# Patient Record
Sex: MALE
[De-identification: names, ages, dates, MRNs, and addresses within clinical notes are randomized per-mention and may not be internally consistent; named-entity substitution may affect disease eponyms.]

---

## 2017-09-03 ENCOUNTER — HOSPITAL ENCOUNTER (EMERGENCY)
Dept: HOSPITAL 14 - H.ER | Age: 22
Discharge: HOME | End: 2017-09-03
Payer: MEDICAID

## 2017-09-03 VITALS — TEMPERATURE: 98 F | RESPIRATION RATE: 16 BRPM

## 2017-09-03 VITALS — SYSTOLIC BLOOD PRESSURE: 135 MMHG | DIASTOLIC BLOOD PRESSURE: 81 MMHG | HEART RATE: 81 BPM

## 2017-09-03 VITALS — OXYGEN SATURATION: 99 %

## 2017-09-03 DIAGNOSIS — R59.0: Primary | ICD-10-CM

## 2017-09-03 DIAGNOSIS — Z90.49: ICD-10-CM

## 2017-09-03 LAB
ALBUMIN/GLOB SERPL: 1.3 {RATIO} (ref 1–2.1)
ALP SERPL-CCNC: 68 U/L (ref 38–126)
ALT SERPL-CCNC: 35 U/L (ref 21–72)
AST SERPL-CCNC: 34 U/L (ref 17–59)
BACTERIA #/AREA URNS HPF: (no result) /[HPF]
BILIRUB SERPL-MCNC: 0.8 MG/DL (ref 0.2–1.3)
BILIRUB UR-MCNC: NEGATIVE MG/DL
BUN SERPL-MCNC: 19 MG/DL (ref 9–20)
CALCIUM SERPL-MCNC: 10.1 MG/DL (ref 8.4–10.2)
CHLORIDE SERPL-SCNC: 102 MMOL/L (ref 98–107)
CO2 SERPL-SCNC: 27 MMOL/L (ref 22–30)
COLOR UR: YELLOW
ERYTHROCYTE [DISTWIDTH] IN BLOOD BY AUTOMATED COUNT: 12.8 % (ref 11.5–14.5)
GLOBULIN SER-MCNC: 3.7 GM/DL (ref 2.2–3.9)
GLUCOSE SERPL-MCNC: 90 MG/DL (ref 75–110)
GLUCOSE UR STRIP-MCNC: (no result) MG/DL
HCT VFR BLD CALC: 45.3 % (ref 35–51)
KETONES UR STRIP-MCNC: NEGATIVE MG/DL
LEUKOCYTE ESTERASE UR-ACNC: (no result) LEU/UL
MCH RBC QN AUTO: 31.1 PG (ref 27–31)
MCHC RBC AUTO-ENTMCNC: 34.7 G/DL (ref 33–37)
MCV RBC AUTO: 89.4 FL (ref 80–94)
PH UR STRIP: 6 [PH] (ref 5–8)
PLATELET # BLD: 232 K/UL (ref 130–400)
POTASSIUM SERPL-SCNC: 4.3 MMOL/L (ref 3.6–5)
PROT SERPL-MCNC: 8.8 G/DL (ref 6.3–8.2)
PROT UR STRIP-MCNC: NEGATIVE MG/DL
RBC # UR STRIP: NEGATIVE /UL
RBC #/AREA URNS HPF: 2 /HPF (ref 0–3)
SODIUM SERPL-SCNC: 142 MMOL/L (ref 132–148)
SP GR UR STRIP: 1.03 (ref 1–1.03)
UROBILINOGEN UR-MCNC: (no result) MG/DL (ref 0.2–1)
WBC # BLD AUTO: 8.4 K/UL (ref 4.8–10.8)
WBC #/AREA URNS HPF: 13 /HPF (ref 0–5)

## 2017-09-03 PROCEDURE — 87086 URINE CULTURE/COLONY COUNT: CPT

## 2017-09-03 PROCEDURE — 99282 EMERGENCY DEPT VISIT SF MDM: CPT

## 2017-09-03 PROCEDURE — 80053 COMPREHEN METABOLIC PANEL: CPT

## 2017-09-03 PROCEDURE — 81003 URINALYSIS AUTO W/O SCOPE: CPT

## 2017-09-03 PROCEDURE — 87491 CHLMYD TRACH DNA AMP PROBE: CPT

## 2017-09-03 PROCEDURE — 72193 CT PELVIS W/DYE: CPT

## 2017-09-03 PROCEDURE — 87591 N.GONORRHOEAE DNA AMP PROB: CPT

## 2017-09-03 PROCEDURE — 85027 COMPLETE CBC AUTOMATED: CPT

## 2017-09-03 NOTE — CT
EXAM:

  CT Pelvis With Intravenous Contrast



CLINICAL HISTORY:

  21 years old, male; Pain; Pelvic pain; Prior surgery; Surgery date: 6+ 

months; Surgery type: Appendectomy childhood; Patient HX: Llq pain/ swelling; 

Additional info: Left sided hernia



TECHNIQUE:

  Axial computed tomography images of the pelvis with intravenous contrast.  

All CT scans at this facility use one or more dose reduction techniques, viz.: 

automated exposure control; ma/kV adjustment per patient size (including 

targeted exams where dose is matched to indication; i.e. head); or iterative 

reconstruction technique.



CONTRAST:

  98 mL of OMNIPAQUE administered intravenously.



COMPARISON:

  No relevant prior studies available.



FINDINGS:

  Bowel:  Unremarkable.  

  Appendix: Surgically absent by history.

  Intraperitoneal space:  No free air.  No significant fluid collection.

  Bladder: Thickened wall, likely representing underdistention.

  Reproductive: Incompletely visualized for adequate comment.

  Bones/joints:  No acute fracture.  No dislocation.

  Soft tissues:  Unremarkable.

  Vasculature:  Unremarkable.

  Lymph nodes: Bulky enlarged lymph nodes are detected within the left 

hemipelvis and left inguinal region. The largest lymph node is the left lateral 

vesicular lymph node measuring 3 cm in short axis dimension. The finding within 

the left groin represents lymphadenopathy, specifically within the superficial 

inguinal lymph node basin, rather than a loop of bowel.



IMPRESSION:     

Marked lymphadenopathy within the left hemipelvis and left groin, as detailed 

above.

## 2017-09-03 NOTE — ED PDOC
HPI: Abdomen


Time Seen by Provider: 09/03/17 19:56


Chief Complaint (Nursing): Abnormal Skin Integrity


Chief Complaint (Provider): Lump on left lower abdomen


History Per: Patient


History/Exam Limitations: no limitations


Onset/Duration Of Symptoms: Days


Outside of [place] travel?: No


Current Symptoms Are (Timing): Still Present


Location Of Pain/Discomfort: LLQ


Quality Of Discomfort: "Pain"


Exacerbating Factors: None


Additional History Per: Patient


Additional Complaint(s): 


The patient is a 20yo male, no known past medical history, presents to the ED 

for evaluation of a lump he noticed on his left lower abdomen, present for the 

past 5 days. Patient reports the pain has been worsening and radiates down his 

left leg. He denies taking any medication for his symptoms. At present, patient 

offers no additional medical complaints.





Past Medical History


Reviewed: Historical Data, Nursing Documentation, Vital Signs


Vital Signs: 


 Last Vital Signs











Temp  98.0 F   09/03/17 19:50


 


Pulse  82   09/03/17 19:50


 


Resp  16   09/03/17 19:50


 


BP  145/75   09/03/17 19:50


 


Pulse Ox  99   09/03/17 20:30














- Medical History


PMH: No Chronic Diseases





- Surgical History


Surgical History: Appendectomy





- Family History


Family History: States: No Known Family Hx





- Social History


Current smoker - smoking cessation education provided: Yes


Alcohol: None


Drugs: Cannabis





- Home Medications


Home Medications: 


 Ambulatory Orders











 Medication  Instructions  Recorded


 


Hydrocortisone [Hydrocortisone 1 applic TOP TID #1 tube 01/14/17





2.5% Cream]  


 


Ibuprofen [Motrin] 600 mg PO Q6 PRN #15 tab 01/14/17


 


Doxycycline Monohydrate [Mondoxyne 100 mg PO BID #20 capsule 09/03/17





Nl]  














- Allergies


Allergies/Adverse Reactions: 


 Allergies











Allergy/AdvReac Type Severity Reaction Status Date / Time


 


No Known Allergies Allergy   Verified 04/06/16 22:46














Review of Systems


ROS Statement: Except As Marked, All Systems Reviewed And Found Negative


Gastrointestinal: Positive for: Abdominal Pain (lump on left lower quadrant)





Physical Exam





- Reviewed


Nursing Documentation Reviewed: Yes


Vital Signs Reviewed: Yes





- Physical Exam


Appears: Positive for: Well, Non-toxic, No Acute Distress


Head Exam: Positive for: ATRAUMATIC, NORMAL INSPECTION, NORMOCEPHALIC


Skin: Positive for: Normal Color


Eye Exam: Positive for: Normal appearance


Neck: Positive for: Normal


Respiratory: Negative for: Accessory Muscle Use, Respiratory Distress


Gastrointestinal/Abdominal: Positive for: Soft, Hernia.  Negative for: 

Tenderness


Extremity: Positive for: Normal ROM.  Negative for: Deformity, Swelling


Neurologic/Psych: Positive for: Alert, Oriented.  Negative for: Motor/Sensory 

Deficits





- Laboratory Results


Result Diagrams: 


 09/03/17 20:45





 09/03/17 20:45





- ECG


O2 Sat by Pulse Oximetry: 99 (RA)


Pulse Ox Interpretation: Normal





Medical Decision Making


Medical Decision Making: 


Time: 2010


Impression: Hernia on left lower quadrant


Plan:


-- CMP


-- CBC


-- CT Pelvis w/ IV contrast


-- Chlamydia/GC RNA, TMA


-- Urine culture


-- Urinalysis


Reassess





Scribe Attestation:


Documented by Susan Foreman acting as a scribe for ETIENNE Larry


Provider Attestation:


All medical record entries made by the Scribe were at my direction and 

personally dictated by me. I have reviewed the chart and agree that the record 

accurately reflects my personal performance of the history, physical exam, 

medical decision making, and the department course for this patient. I have 

also personally directed, reviewed, and agree with the discharge instructions 

and disposition.








Disposition





- Clinical Impression


Clinical Impression: 


 Lymphadenopathy, inguinal








- Patient ED Disposition


Is Patient to be Admitted: No


Counseled Patient/Family Regarding: Diagnosis, Need For Followup, Rx Given





- Disposition


Referrals: 


[facility] [Outside]


[facility] [Outside]


Disposition: Routine/Home


Disposition Time: 22:47


Condition: STABLE


Additional Instructions: 


If symptoms do not improve please follow-up with MD for further evaluation. 


Prescriptions: 


Doxycycline Monohydrate [Mondoxyne Nl] 100 mg PO BID #20 capsule


Instructions:  Lymphadenopathy (ED)


Forms:  [facility] ED School/Work Excuse

## 2018-04-10 ENCOUNTER — HOSPITAL ENCOUNTER (EMERGENCY)
Dept: HOSPITAL 14 - H.ER | Age: 23
LOS: 1 days | Discharge: HOME | End: 2018-04-11
Payer: MEDICAID

## 2018-04-10 VITALS
RESPIRATION RATE: 16 BRPM | DIASTOLIC BLOOD PRESSURE: 81 MMHG | OXYGEN SATURATION: 98 % | HEART RATE: 77 BPM | TEMPERATURE: 97.8 F | SYSTOLIC BLOOD PRESSURE: 133 MMHG

## 2018-04-10 DIAGNOSIS — L03.114: Primary | ICD-10-CM

## 2018-04-11 NOTE — ED PDOC
Upper Extremity Pain/Injury


Time Seen by Provider: 18 00:16


Chief Complaint (Nursing): Abnormal Skin Integrity


History Per: Patient


History/Exam Limitations: no limitations


Current Symptoms Are (Timing): Still Present


Additional Complaint(s): 





No PMHx p/w redness around tattoo, got tattoo last friday and redness developed 

today with pain.  no fevers, chills, swelling, or other symptoms.





Past Medical History


Reviewed: Historical Data, Nursing Documentation, Vital Signs


Vital Signs: 


 Last Vital Signs











Temp  97.8 F   04/10/18 23:37


 


Pulse  77   04/10/18 23:37


 


Resp  16   04/10/18 23:37


 


BP  133/81   04/10/18 23:37


 


Pulse Ox  98   04/10/18 23:37














- Surgical History


Surgical History: Appendectomy





- Family History


Family History: States: Unknown Family Hx





- Home Medications


Home Medications: 


 Ambulatory Orders











 Medication  Instructions  Recorded


 


Hydrocortisone [Hydrocortisone 1 applic TOP TID #1 tube 17





2.5% Cream]  


 


Ibuprofen [Motrin] 600 mg PO Q6 PRN #15 tab 17


 


Doxycycline Monohydrate [Mondoxyne 100 mg PO BID #20 capsule 17





Nl]  


 


Clindamycin [Cleocin] 300 mg PO TID 10 Days  cap 18


 


Ibuprofen [Motrin Tab] 600 mg PO Q6 #30 tab 18














- Allergies


Allergies/Adverse Reactions: 


 Allergies











Allergy/AdvReac Type Severity Reaction Status Date / Time


 


No Known Allergies Allergy   Verified 16 22:46














Review of Systems


ROS Statement: Except As Marked, All Systems Reviewed And Found Negative





Physical Exam





- Reviewed


Nursing Documentation Reviewed: Yes


Vital Signs Reviewed: Yes





- Physical Exam


Appears: Positive for: Well, Non-toxic


Head Exam: Positive for: ATRAUMATIC, NORMAL INSPECTION


Skin: Positive for: Rash (LUE tatoo w/ 2cm ring of erythema, no fluctuance/

induration, warm, no streaking)





- ECG


O2 Sat by Pulse Oximetry: 98





Medical Decision Making


Medical Decision Makin


A/P: NO PMhx p/w infected tattoo


-high risk for MRSA given tattoo was done at a "tattoo party"


-vitals stable


-will treat w/ clinda, advised to f/u w/ PMD to recheck wound or return to ER 

for worsening symptoms





Disposition





- Clinical Impression


Clinical Impression: 


 Cellulitis








- Disposition


Referrals: 


[facility] [Outside]


Disposition: Routine/Home


Disposition Time: 00:36


Condition: GOOD


Prescriptions: 


Clindamycin [Cleocin] 300 mg PO TID 10 Days  cap


Ibuprofen [Motrin Tab] 600 mg PO Q6 #30 tab


Instructions:  Cellulitis and Erysipelas (Skin Infections)